# Patient Record
Sex: MALE | URBAN - METROPOLITAN AREA
[De-identification: names, ages, dates, MRNs, and addresses within clinical notes are randomized per-mention and may not be internally consistent; named-entity substitution may affect disease eponyms.]

---

## 2017-08-07 ENCOUNTER — APPOINTMENT (RX ONLY)
Dept: URBAN - METROPOLITAN AREA CLINIC 142 | Facility: CLINIC | Age: 47
Setting detail: DERMATOLOGY
End: 2017-08-07

## 2017-08-07 DIAGNOSIS — T81.89 OTHER COMPLICATIONS OF PROCEDURES, NOT ELSEWHERE CLASSIFIED: ICD-10-CM

## 2017-08-07 PROBLEM — T81.89XA OTHER COMPLICATIONS OF PROCEDURES, NOT ELSEWHERE CLASSIFIED, INITIAL ENCOUNTER: Status: ACTIVE | Noted: 2017-08-07

## 2017-08-07 PROCEDURE — ? COUNSELING

## 2017-08-07 PROCEDURE — 99202 OFFICE O/P NEW SF 15 MIN: CPT

## 2017-08-07 PROCEDURE — ? PRESCRIPTION

## 2017-08-07 PROCEDURE — ? TREATMENT REGIMEN

## 2017-08-07 RX ORDER — DOXYCYCLINE HYCLATE 100 MG/1
CAPSULE, GELATIN COATED ORAL
Qty: 28 | Refills: 0 | Status: ERX | COMMUNITY
Start: 2017-08-07

## 2017-08-07 RX ORDER — TRIAMCINOLONE ACETONIDE 1 MG/G
OINTMENT TOPICAL
Qty: 1 | Refills: 0 | Status: ERX | COMMUNITY
Start: 2017-08-07

## 2017-08-07 RX ADMIN — DOXYCYCLINE HYCLATE: 100 CAPSULE, GELATIN COATED ORAL at 14:49

## 2017-08-07 RX ADMIN — TRIAMCINOLONE ACETONIDE: 1 OINTMENT TOPICAL at 14:49

## 2017-08-07 ASSESSMENT — LOCATION DETAILED DESCRIPTION DERM: LOCATION DETAILED: LEFT MEDIAL DISTAL PRETIBIAL REGION

## 2017-08-07 ASSESSMENT — LOCATION ZONE DERM: LOCATION ZONE: LEG

## 2017-08-07 ASSESSMENT — LOCATION SIMPLE DESCRIPTION DERM: LOCATION SIMPLE: LEFT PRETIBIAL REGION

## 2017-08-07 NOTE — HPI: SKIN LESIONS
Is This A New Presentation, Or A Follow-Up?: Skin Lesion
How Severe Is Your Skin Lesion?: moderate
Have Your Skin Lesions Been Treated?: been treated

## 2017-08-21 ENCOUNTER — APPOINTMENT (RX ONLY)
Dept: URBAN - METROPOLITAN AREA CLINIC 142 | Facility: CLINIC | Age: 47
Setting detail: DERMATOLOGY
End: 2017-08-21

## 2017-08-21 DIAGNOSIS — T81.89 OTHER COMPLICATIONS OF PROCEDURES, NOT ELSEWHERE CLASSIFIED: ICD-10-CM | Status: IMPROVED

## 2017-08-21 PROBLEM — T81.89XA OTHER COMPLICATIONS OF PROCEDURES, NOT ELSEWHERE CLASSIFIED, INITIAL ENCOUNTER: Status: ACTIVE | Noted: 2017-08-21

## 2017-08-21 PROCEDURE — ? COUNSELING

## 2017-08-21 PROCEDURE — 99212 OFFICE O/P EST SF 10 MIN: CPT

## 2017-08-21 PROCEDURE — ? TREATMENT REGIMEN

## 2017-08-21 ASSESSMENT — LOCATION SIMPLE DESCRIPTION DERM: LOCATION SIMPLE: LEFT PRETIBIAL REGION

## 2017-08-21 ASSESSMENT — LOCATION DETAILED DESCRIPTION DERM: LOCATION DETAILED: LEFT MEDIAL DISTAL PRETIBIAL REGION

## 2017-08-21 ASSESSMENT — LOCATION ZONE DERM: LOCATION ZONE: LEG

## 2017-10-26 ENCOUNTER — HOSPITAL ENCOUNTER (OUTPATIENT)
Dept: HOSPITAL 61 - PMGWOUND | Age: 47
Discharge: HOME | End: 2017-10-26
Attending: EMERGENCY MEDICINE
Payer: COMMERCIAL

## 2017-10-26 DIAGNOSIS — J45.909: ICD-10-CM

## 2017-10-26 DIAGNOSIS — T63.331A: Primary | ICD-10-CM

## 2017-10-26 DIAGNOSIS — E03.9: ICD-10-CM
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
## 2017-10-26 DIAGNOSIS — Z87.891: ICD-10-CM

## 2017-10-26 PROCEDURE — 97597 DBRDMT OPN WND 1ST 20 CM/<: CPT

## 2017-11-02 ENCOUNTER — HOSPITAL ENCOUNTER (OUTPATIENT)
Dept: HOSPITAL 61 - PMGWOUND | Age: 47
Discharge: HOME | End: 2017-11-02
Attending: EMERGENCY MEDICINE
Payer: COMMERCIAL

## 2017-11-02 DIAGNOSIS — T63.331D: Primary | ICD-10-CM

## 2017-11-02 DIAGNOSIS — J45.909: ICD-10-CM

## 2017-11-02 DIAGNOSIS — E03.9: ICD-10-CM

## 2017-11-02 DIAGNOSIS — Z87.891: ICD-10-CM

## 2017-11-02 PROCEDURE — 99214 OFFICE O/P EST MOD 30 MIN: CPT

## 2017-11-16 ENCOUNTER — HOSPITAL ENCOUNTER (OUTPATIENT)
Dept: HOSPITAL 61 - PMGWOUND | Age: 47
Discharge: HOME | End: 2017-11-16
Attending: EMERGENCY MEDICINE
Payer: COMMERCIAL

## 2017-11-16 DIAGNOSIS — J45.909: ICD-10-CM

## 2017-11-16 DIAGNOSIS — T63.331D: Primary | ICD-10-CM

## 2017-11-16 DIAGNOSIS — E03.9: ICD-10-CM

## 2017-11-16 DIAGNOSIS — Z87.891: ICD-10-CM

## 2017-11-16 PROCEDURE — 99214 OFFICE O/P EST MOD 30 MIN: CPT

## 2017-11-30 ENCOUNTER — HOSPITAL ENCOUNTER (OUTPATIENT)
Dept: HOSPITAL 61 - PMGWOUND | Age: 47
Discharge: HOME | End: 2017-11-30
Attending: EMERGENCY MEDICINE
Payer: COMMERCIAL

## 2017-11-30 DIAGNOSIS — E03.9: ICD-10-CM

## 2017-11-30 DIAGNOSIS — T63.331D: Primary | ICD-10-CM

## 2017-11-30 DIAGNOSIS — Z87.891: ICD-10-CM
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
## 2017-11-30 DIAGNOSIS — J45.909: ICD-10-CM

## 2017-11-30 PROCEDURE — 99214 OFFICE O/P EST MOD 30 MIN: CPT

## 2017-12-11 ENCOUNTER — HOSPITAL ENCOUNTER (OUTPATIENT)
Dept: HOSPITAL 61 - PMGWOUND | Age: 47
Discharge: HOME | End: 2017-12-11
Attending: EMERGENCY MEDICINE
Payer: COMMERCIAL

## 2017-12-11 DIAGNOSIS — T63.331D: Primary | ICD-10-CM

## 2017-12-11 DIAGNOSIS — J45.909: ICD-10-CM

## 2017-12-11 DIAGNOSIS — E03.9: ICD-10-CM

## 2017-12-11 DIAGNOSIS — Z87.891: ICD-10-CM

## 2017-12-11 PROCEDURE — 11100: CPT

## 2017-12-11 PROCEDURE — 73590 X-RAY EXAM OF LOWER LEG: CPT
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
## 2017-12-13 NOTE — PATHOLOGY
PATHOLOGY REPORT 

 

*    *    *    *    *    *    *    * 

 FINAL DIAGNOSIS:

A.  Skin, left leg wound punch biopsy: 

- Chronic stasis changes. 

B.  Skin, left leg wound punch biopsy:

- Chronic stasis changes.

 

COMMENT:

There is no evidence of malignancy.  The case is also examined by Dr. Espinoza, dermatopathologist, who concurs with the diagnoses. 

(JPM:rlflorecita; 2017) 

 

 

REPORT ELECTRONICALLY SIGNED BY:   Alfred Padilla M.D.

DATE/TIME:   2017 14:44

*    *    *    *    *    *    *    *

 

GROSS PATHOLOGY:

A.  Received in formalin labeled "Saul Barrera, wound biopsy left

leg" and consists of a 0.2 cm brown red tissue fragment.  The

specimen is totally submitted as A1.

B.  Received in formalin labeled "Saul Barrera, wound biopsy left

leg" and consists of a 0.2 cm tan-red tissue fragment.  Totally

submitted as B1.

(SWAPNIL; 2017)

 

 

 INITIAL CPT CODE(S):

A; 55450

B; 86337

Professional services performed by [facility] [place] 

[place] [address]

 

  Technical services performed by [facility] at [address] [place] [address].

  

 SPECIMEN(S) RECEIVED:

A.Wound biopsy left leg

B.Wound biopsy left leg

 

 CLINICAL HISTORY:

Left medial lower leg wound  *dermpath if available*

 

 PATIENT:  SAUL BARRERA

/AGE:  1970 (Age: 47)  

PATIENT #:  [id number]

ACCESSION #:  [id number]          ALT CASE #:   

SPECIMEN COLLECTION DATE:  2017

SPECIMEN RECEIVED DATE:  2017

   

[facility] - [address] [place] [address] - PHONE: 

[phone]

* * *  END OF REPORT  * * *

## 2017-12-18 ENCOUNTER — HOSPITAL ENCOUNTER (OUTPATIENT)
Dept: HOSPITAL 61 - PMGWOUND | Age: 47
Discharge: HOME | End: 2017-12-18
Attending: EMERGENCY MEDICINE
Payer: COMMERCIAL

## 2017-12-18 DIAGNOSIS — T63.331D: Primary | ICD-10-CM

## 2017-12-18 DIAGNOSIS — Z87.891: ICD-10-CM

## 2017-12-18 DIAGNOSIS — J45.909: ICD-10-CM

## 2017-12-18 DIAGNOSIS — E03.9: ICD-10-CM

## 2017-12-18 PROCEDURE — 99214 OFFICE O/P EST MOD 30 MIN: CPT

## 2017-12-22 ENCOUNTER — HOSPITAL ENCOUNTER (OUTPATIENT)
Dept: HOSPITAL 61 - PMGWOUND | Age: 47
Discharge: HOME | End: 2017-12-22
Attending: FAMILY MEDICINE
Payer: COMMERCIAL

## 2017-12-22 DIAGNOSIS — E03.9: ICD-10-CM

## 2017-12-22 DIAGNOSIS — T63.331D: Primary | ICD-10-CM

## 2017-12-22 DIAGNOSIS — J45.909: ICD-10-CM

## 2017-12-22 DIAGNOSIS — Z87.891: ICD-10-CM

## 2017-12-22 PROCEDURE — 99213 OFFICE O/P EST LOW 20 MIN: CPT

## 2017-12-26 ENCOUNTER — HOSPITAL ENCOUNTER (OUTPATIENT)
Dept: HOSPITAL 61 - PMGWOUND | Age: 47
Discharge: HOME | End: 2017-12-26
Attending: EMERGENCY MEDICINE
Payer: COMMERCIAL

## 2017-12-26 DIAGNOSIS — T63.331D: Primary | ICD-10-CM

## 2017-12-26 DIAGNOSIS — J45.909: ICD-10-CM

## 2017-12-26 DIAGNOSIS — Z87.891: ICD-10-CM

## 2017-12-26 DIAGNOSIS — E03.9: ICD-10-CM

## 2017-12-26 PROCEDURE — 99213 OFFICE O/P EST LOW 20 MIN: CPT

## 2018-01-08 ENCOUNTER — HOSPITAL ENCOUNTER (OUTPATIENT)
Dept: HOSPITAL 61 - PMGWOUND | Age: 48
Discharge: HOME | End: 2018-01-08
Attending: EMERGENCY MEDICINE
Payer: COMMERCIAL

## 2018-01-08 DIAGNOSIS — J45.909: ICD-10-CM

## 2018-01-08 DIAGNOSIS — Z87.891: ICD-10-CM

## 2018-01-08 DIAGNOSIS — E03.9: ICD-10-CM

## 2018-01-08 DIAGNOSIS — T63.331D: Primary | ICD-10-CM

## 2018-01-08 DIAGNOSIS — L03.115: ICD-10-CM

## 2018-01-08 PROCEDURE — 97597 DBRDMT OPN WND 1ST 20 CM/<: CPT

## 2018-01-12 ENCOUNTER — HOSPITAL ENCOUNTER (OUTPATIENT)
Dept: HOSPITAL 61 - US | Age: 48
Discharge: HOME | End: 2018-01-12
Attending: EMERGENCY MEDICINE
Payer: COMMERCIAL

## 2018-01-12 DIAGNOSIS — I87.2: ICD-10-CM

## 2018-01-12 DIAGNOSIS — M79.605: Primary | ICD-10-CM

## 2018-01-12 PROCEDURE — 93971 EXTREMITY STUDY: CPT

## 2018-01-15 ENCOUNTER — HOSPITAL ENCOUNTER (OUTPATIENT)
Dept: HOSPITAL 61 - PMGWOUND | Age: 48
Discharge: HOME | End: 2018-01-15
Attending: EMERGENCY MEDICINE
Payer: COMMERCIAL

## 2018-01-15 DIAGNOSIS — L97.212: ICD-10-CM

## 2018-01-15 DIAGNOSIS — T63.331D: Primary | ICD-10-CM

## 2018-01-15 DIAGNOSIS — Z87.891: ICD-10-CM

## 2018-01-15 DIAGNOSIS — E03.9: ICD-10-CM

## 2018-01-15 DIAGNOSIS — J45.909: ICD-10-CM

## 2018-01-15 DIAGNOSIS — I87.311: ICD-10-CM

## 2018-01-15 PROCEDURE — 29581 APPL MULTLAYER CMPRN SYS LEG: CPT

## 2018-01-18 ENCOUNTER — HOSPITAL ENCOUNTER (OUTPATIENT)
Dept: HOSPITAL 61 - PMGWOUND | Age: 48
Discharge: HOME | End: 2018-01-18
Attending: EMERGENCY MEDICINE
Payer: COMMERCIAL

## 2018-01-18 DIAGNOSIS — I87.311: ICD-10-CM

## 2018-01-18 DIAGNOSIS — T63.331D: Primary | ICD-10-CM

## 2018-01-18 DIAGNOSIS — Z87.891: ICD-10-CM

## 2018-01-18 DIAGNOSIS — L97.212: ICD-10-CM

## 2018-01-18 DIAGNOSIS — E03.9: ICD-10-CM

## 2018-01-18 DIAGNOSIS — J45.909: ICD-10-CM

## 2018-01-18 PROCEDURE — 99214 OFFICE O/P EST MOD 30 MIN: CPT

## 2018-01-22 ENCOUNTER — HOSPITAL ENCOUNTER (OUTPATIENT)
Dept: HOSPITAL 61 - PMGWOUND | Age: 48
Discharge: HOME | End: 2018-01-22
Attending: EMERGENCY MEDICINE
Payer: COMMERCIAL

## 2018-01-22 DIAGNOSIS — Z87.891: ICD-10-CM

## 2018-01-22 DIAGNOSIS — J45.909: ICD-10-CM

## 2018-01-22 DIAGNOSIS — L97.212: ICD-10-CM

## 2018-01-22 DIAGNOSIS — I87.311: ICD-10-CM

## 2018-01-22 DIAGNOSIS — E03.9: ICD-10-CM

## 2018-01-22 DIAGNOSIS — T63.331D: Primary | ICD-10-CM

## 2018-01-22 PROCEDURE — 99214 OFFICE O/P EST MOD 30 MIN: CPT

## 2022-02-24 NOTE — PROCEDURE: TREATMENT REGIMEN
Discontinue Regimen: All rubbing alcohol.peroxide due to drying out the skin even more
Plan: Pt advised to f/u in 2 weeks to re-check the area. Observation photo was taken.\\n\\nAlso discussed possibility of getting wound care involved if not healing properly
Detail Level: Zone
Initiate Treatment: Elevating the leg above the level of the heart for 2 minutes QHS. \\nkeep area under occlusion/moist at all times
74.8